# Patient Record
Sex: MALE | Race: WHITE | HISPANIC OR LATINO
[De-identification: names, ages, dates, MRNs, and addresses within clinical notes are randomized per-mention and may not be internally consistent; named-entity substitution may affect disease eponyms.]

---

## 2019-06-08 ENCOUNTER — TRANSCRIPTION ENCOUNTER (OUTPATIENT)
Age: 19
End: 2019-06-08

## 2019-06-09 ENCOUNTER — RESULT REVIEW (OUTPATIENT)
Age: 19
End: 2019-06-09

## 2019-06-09 ENCOUNTER — INPATIENT (INPATIENT)
Facility: HOSPITAL | Age: 19
LOS: 0 days | Discharge: HOME | DRG: 343 | End: 2019-06-10
Attending: SURGERY | Admitting: SURGERY
Payer: MEDICAID

## 2019-06-09 VITALS
RESPIRATION RATE: 18 BRPM | OXYGEN SATURATION: 99 % | WEIGHT: 184.97 LBS | TEMPERATURE: 98 F | HEART RATE: 83 BPM | SYSTOLIC BLOOD PRESSURE: 146 MMHG | DIASTOLIC BLOOD PRESSURE: 70 MMHG | HEIGHT: 64 IN

## 2019-06-09 DIAGNOSIS — K35.80 UNSPECIFIED ACUTE APPENDICITIS: ICD-10-CM

## 2019-06-09 LAB
ALBUMIN SERPL ELPH-MCNC: 4.3 G/DL — SIGNIFICANT CHANGE UP (ref 3.3–5.2)
ALP SERPL-CCNC: 103 U/L — SIGNIFICANT CHANGE UP (ref 40–120)
ALT FLD-CCNC: 101 U/L — HIGH
ANION GAP SERPL CALC-SCNC: 12 MMOL/L — SIGNIFICANT CHANGE UP (ref 5–17)
APPEARANCE UR: CLEAR — SIGNIFICANT CHANGE UP
AST SERPL-CCNC: 35 U/L — SIGNIFICANT CHANGE UP
BACTERIA # UR AUTO: NEGATIVE — SIGNIFICANT CHANGE UP
BILIRUB SERPL-MCNC: 0.4 MG/DL — SIGNIFICANT CHANGE UP (ref 0.4–2)
BILIRUB UR-MCNC: NEGATIVE — SIGNIFICANT CHANGE UP
BUN SERPL-MCNC: 12 MG/DL — SIGNIFICANT CHANGE UP (ref 8–20)
CALCIUM SERPL-MCNC: 9.3 MG/DL — SIGNIFICANT CHANGE UP (ref 8.6–10.2)
CHLORIDE SERPL-SCNC: 105 MMOL/L — SIGNIFICANT CHANGE UP (ref 98–107)
CO2 SERPL-SCNC: 22 MMOL/L — SIGNIFICANT CHANGE UP (ref 22–29)
COLOR SPEC: YELLOW — SIGNIFICANT CHANGE UP
CREAT SERPL-MCNC: 0.78 MG/DL — SIGNIFICANT CHANGE UP (ref 0.5–1.3)
DIFF PNL FLD: ABNORMAL
EPI CELLS # UR: SIGNIFICANT CHANGE UP
GLUCOSE SERPL-MCNC: 102 MG/DL — SIGNIFICANT CHANGE UP (ref 70–115)
GLUCOSE UR QL: NEGATIVE MG/DL — SIGNIFICANT CHANGE UP
HCT VFR BLD CALC: 43.2 % — SIGNIFICANT CHANGE UP (ref 42–52)
HGB BLD-MCNC: 15.2 G/DL — SIGNIFICANT CHANGE UP (ref 14–18)
KETONES UR-MCNC: NEGATIVE — SIGNIFICANT CHANGE UP
LEUKOCYTE ESTERASE UR-ACNC: NEGATIVE — SIGNIFICANT CHANGE UP
LIDOCAIN IGE QN: 31 U/L — SIGNIFICANT CHANGE UP (ref 22–51)
MCHC RBC-ENTMCNC: 30.1 PG — SIGNIFICANT CHANGE UP (ref 27–31)
MCHC RBC-ENTMCNC: 35.2 G/DL — SIGNIFICANT CHANGE UP (ref 32–36)
MCV RBC AUTO: 85.5 FL — SIGNIFICANT CHANGE UP (ref 80–94)
NITRITE UR-MCNC: NEGATIVE — SIGNIFICANT CHANGE UP
PH UR: 6 — SIGNIFICANT CHANGE UP (ref 5–8)
PLATELET # BLD AUTO: 275 K/UL — SIGNIFICANT CHANGE UP (ref 150–400)
POTASSIUM SERPL-MCNC: 4.2 MMOL/L — SIGNIFICANT CHANGE UP (ref 3.5–5.3)
POTASSIUM SERPL-SCNC: 4.2 MMOL/L — SIGNIFICANT CHANGE UP (ref 3.5–5.3)
PROT SERPL-MCNC: 7.9 G/DL — SIGNIFICANT CHANGE UP (ref 6.6–8.7)
PROT UR-MCNC: 15 MG/DL
RBC # BLD: 5.05 M/UL — SIGNIFICANT CHANGE UP (ref 4.6–6.2)
RBC # FLD: 13.5 % — SIGNIFICANT CHANGE UP (ref 11–15.6)
RBC CASTS # UR COMP ASSIST: ABNORMAL /HPF (ref 0–4)
SODIUM SERPL-SCNC: 139 MMOL/L — SIGNIFICANT CHANGE UP (ref 135–145)
SP GR SPEC: 1.01 — SIGNIFICANT CHANGE UP (ref 1.01–1.02)
UROBILINOGEN FLD QL: NEGATIVE MG/DL — SIGNIFICANT CHANGE UP
WBC # BLD: 9.7 K/UL — SIGNIFICANT CHANGE UP (ref 4.8–10.8)
WBC # FLD AUTO: 9.7 K/UL — SIGNIFICANT CHANGE UP (ref 4.8–10.8)
WBC UR QL: SIGNIFICANT CHANGE UP

## 2019-06-09 PROCEDURE — 44970 LAPAROSCOPY APPENDECTOMY: CPT

## 2019-06-09 PROCEDURE — 74177 CT ABD & PELVIS W/CONTRAST: CPT | Mod: 26

## 2019-06-09 PROCEDURE — 88304 TISSUE EXAM BY PATHOLOGIST: CPT | Mod: 26

## 2019-06-09 PROCEDURE — 99285 EMERGENCY DEPT VISIT HI MDM: CPT | Mod: 25

## 2019-06-09 RX ORDER — HYDROMORPHONE HYDROCHLORIDE 2 MG/ML
0.5 INJECTION INTRAMUSCULAR; INTRAVENOUS; SUBCUTANEOUS
Refills: 0 | Status: DISCONTINUED | OUTPATIENT
Start: 2019-06-09 | End: 2019-06-09

## 2019-06-09 RX ORDER — CEFOTETAN DISODIUM 1 G
2 VIAL (EA) INJECTION EVERY 12 HOURS
Refills: 0 | Status: DISCONTINUED | OUTPATIENT
Start: 2019-06-09 | End: 2019-06-10

## 2019-06-09 RX ORDER — MORPHINE SULFATE 50 MG/1
1 CAPSULE, EXTENDED RELEASE ORAL EVERY 4 HOURS
Refills: 0 | Status: DISCONTINUED | OUTPATIENT
Start: 2019-06-09 | End: 2019-06-10

## 2019-06-09 RX ORDER — TRAMADOL HYDROCHLORIDE 50 MG/1
25 TABLET ORAL EVERY 4 HOURS
Refills: 0 | Status: DISCONTINUED | OUTPATIENT
Start: 2019-06-09 | End: 2019-06-10

## 2019-06-09 RX ORDER — KETOROLAC TROMETHAMINE 30 MG/ML
15 SYRINGE (ML) INJECTION ONCE
Refills: 0 | Status: DISCONTINUED | OUTPATIENT
Start: 2019-06-09 | End: 2019-06-09

## 2019-06-09 RX ORDER — SODIUM CHLORIDE 9 MG/ML
1000 INJECTION, SOLUTION INTRAVENOUS
Refills: 0 | Status: DISCONTINUED | OUTPATIENT
Start: 2019-06-09 | End: 2019-06-09

## 2019-06-09 RX ORDER — ONDANSETRON 8 MG/1
4 TABLET, FILM COATED ORAL EVERY 6 HOURS
Refills: 0 | Status: DISCONTINUED | OUTPATIENT
Start: 2019-06-09 | End: 2019-06-10

## 2019-06-09 RX ORDER — MORPHINE SULFATE 50 MG/1
4 CAPSULE, EXTENDED RELEASE ORAL ONCE
Refills: 0 | Status: DISCONTINUED | OUTPATIENT
Start: 2019-06-09 | End: 2019-06-09

## 2019-06-09 RX ORDER — ONDANSETRON 8 MG/1
4 TABLET, FILM COATED ORAL ONCE
Refills: 0 | Status: DISCONTINUED | OUTPATIENT
Start: 2019-06-09 | End: 2019-06-09

## 2019-06-09 RX ORDER — TRAMADOL HYDROCHLORIDE 50 MG/1
50 TABLET ORAL EVERY 6 HOURS
Refills: 0 | Status: DISCONTINUED | OUTPATIENT
Start: 2019-06-09 | End: 2019-06-10

## 2019-06-09 RX ORDER — SODIUM CHLORIDE 9 MG/ML
1000 INJECTION INTRAMUSCULAR; INTRAVENOUS; SUBCUTANEOUS ONCE
Refills: 0 | Status: COMPLETED | OUTPATIENT
Start: 2019-06-09 | End: 2019-06-09

## 2019-06-09 RX ORDER — FENTANYL CITRATE 50 UG/ML
25 INJECTION INTRAVENOUS ONCE
Refills: 0 | Status: DISCONTINUED | OUTPATIENT
Start: 2019-06-09 | End: 2019-06-09

## 2019-06-09 RX ORDER — ONDANSETRON 8 MG/1
4 TABLET, FILM COATED ORAL ONCE
Refills: 0 | Status: COMPLETED | OUTPATIENT
Start: 2019-06-09 | End: 2019-06-09

## 2019-06-09 RX ORDER — ACETAMINOPHEN 500 MG
975 TABLET ORAL EVERY 6 HOURS
Refills: 0 | Status: DISCONTINUED | OUTPATIENT
Start: 2019-06-09 | End: 2019-06-10

## 2019-06-09 RX ADMIN — HYDROMORPHONE HYDROCHLORIDE 0.5 MILLIGRAM(S): 2 INJECTION INTRAMUSCULAR; INTRAVENOUS; SUBCUTANEOUS at 20:40

## 2019-06-09 RX ADMIN — TRAMADOL HYDROCHLORIDE 50 MILLIGRAM(S): 50 TABLET ORAL at 21:11

## 2019-06-09 RX ADMIN — FENTANYL CITRATE 25 MICROGRAM(S): 50 INJECTION INTRAVENOUS at 10:45

## 2019-06-09 RX ADMIN — MORPHINE SULFATE 4 MILLIGRAM(S): 50 CAPSULE, EXTENDED RELEASE ORAL at 09:20

## 2019-06-09 RX ADMIN — ONDANSETRON 4 MILLIGRAM(S): 8 TABLET, FILM COATED ORAL at 10:48

## 2019-06-09 RX ADMIN — MORPHINE SULFATE 4 MILLIGRAM(S): 50 CAPSULE, EXTENDED RELEASE ORAL at 09:27

## 2019-06-09 RX ADMIN — FENTANYL CITRATE 25 MICROGRAM(S): 50 INJECTION INTRAVENOUS at 13:02

## 2019-06-09 RX ADMIN — MORPHINE SULFATE 4 MILLIGRAM(S): 50 CAPSULE, EXTENDED RELEASE ORAL at 14:39

## 2019-06-09 RX ADMIN — Medication 100 GRAM(S): at 15:20

## 2019-06-09 RX ADMIN — SODIUM CHLORIDE 1000 MILLILITER(S): 9 INJECTION INTRAMUSCULAR; INTRAVENOUS; SUBCUTANEOUS at 08:50

## 2019-06-09 RX ADMIN — TRAMADOL HYDROCHLORIDE 50 MILLIGRAM(S): 50 TABLET ORAL at 22:15

## 2019-06-09 RX ADMIN — Medication 15 MILLIGRAM(S): at 08:50

## 2019-06-09 RX ADMIN — HYDROMORPHONE HYDROCHLORIDE 0.5 MILLIGRAM(S): 2 INJECTION INTRAMUSCULAR; INTRAVENOUS; SUBCUTANEOUS at 20:30

## 2019-06-09 RX ADMIN — MORPHINE SULFATE 4 MILLIGRAM(S): 50 CAPSULE, EXTENDED RELEASE ORAL at 13:06

## 2019-06-09 RX ADMIN — Medication 15 MILLIGRAM(S): at 09:10

## 2019-06-09 RX ADMIN — SODIUM CHLORIDE 1000 MILLILITER(S): 9 INJECTION INTRAMUSCULAR; INTRAVENOUS; SUBCUTANEOUS at 09:11

## 2019-06-09 NOTE — H&P ADULT - HISTORY OF PRESENT ILLNESS
17 yo M w/ CC of abdominal pain. Onset: Yesterday, mid-day. Never experienced this type of pain before. Localized to RLQ, constant, sharp, getting progressively worse. Denies nausea/vomiting. Last BM was yesterday - loose, no blood. Voiding at baseline, no burning. Denies fevers/chills. Last ate yesterday night.    Pmhx: Denies  Pshx: Denies  Meds: Denies  Allergies: NKDA  Shx: Denies x 3

## 2019-06-09 NOTE — ED PROVIDER NOTE - ATTENDING CONTRIBUTION TO CARE
I, Crystal Lopez, performed the initial face to face bedside interview with this patient regarding history of present illness, review of symptoms and relevant past medical, social and family history.  I completed an independent physical examination.  I was the initial provider who evaluated this patient. I have signed out the follow up of any pending tests (i.e. labs, radiological studies) to the ACP.  I have communicated the patient’s plan of care and disposition with the ACP.

## 2019-06-09 NOTE — ED ADULT NURSE REASSESSMENT NOTE - NS ED NURSE REASSESS COMMENT FT1
pt status unchanged, refer to flowsheet and chart, pt safety maintained, pt hemodynamically stable. Pt returned from CT Scan, accompanied by his mother, flank pain is back to 8/10. RONNY Bronson notified.

## 2019-06-09 NOTE — ED ADULT NURSE NOTE - NSIMPLEMENTINTERV_GEN_ALL_ED
Implemented All Universal Safety Interventions:  [place] to call system. Call bell, personal items and telephone within reach. Instruct patient to call for assistance. Room bathroom lighting operational. Non-slip footwear when patient is off stretcher. Physically safe environment: no spills, clutter or unnecessary equipment. Stretcher in lowest position, wheels locked, appropriate side rails in place.

## 2019-06-09 NOTE — ED ADULT NURSE REASSESSMENT NOTE - NS ED NURSE REASSESS COMMENT FT1
Report given to OR RN, pt cleared to transport off monitor. pt status unchanged, refer to flowsheet and chart, pt safety maintained, pt hemodynamically stable.

## 2019-06-09 NOTE — ED PROVIDER NOTE - CHPI ED SYMPTOMS NEG
no nausea/no burning urination/no fever/no chills/no dysuria/no hematuria/no vomiting no dysuria/no hematuria/no burning urination/no chills/no diarrhea/no vomiting/no abdominal distension/no fever/no blood in stool/no nausea

## 2019-06-09 NOTE — ED ADULT NURSE NOTE - OBJECTIVE STATEMENT
18y A&Ox4, c/o R flank pain that began last night. Pt states he's had diarrhea and chills for a week. Pt reports no relieving factors, states changing position causes pain. Pt denies having a fever.

## 2019-06-09 NOTE — H&P ADULT - ASSESSMENT
17 yo M w/ CT findings of thickened appendiceal wall and surrounding inflammation concerning for early appendicitis    Admit to surgery  NPO/IVF  Pain control  IV abx  Preop and board for laparoscopic appendectomy, possible open

## 2019-06-09 NOTE — H&P ADULT - ATTENDING COMMENTS
Seen and examined.  Details per resident H+P.  17 yo male presents w/ 1 day h/o abdominal pain localized to the RLQ.  No N/V/F/C.  Afebrile.  HD normal.  No leukocytosis.  NAD.  Abdomen: S/ND, TTP RLQ w/ localized rebound.  CT A/P reviewed - dilated appendix w/ periappendiceal stranding.  Clinically and radiographically acute appendicitis.  Plan for lap appy, possible open.

## 2019-06-09 NOTE — H&P ADULT - NSHPPHYSICALEXAM_GEN_ALL_CORE
PE  Gen: AOX3, mild acute distress  Pulm: Non labored breathing, CTAB  CV: RRR  Abd: Soft, mild distension, +TTP RLQ at Mcburney's point, focal guarding, no rebound  Ext: Moving all 4 extremities  Vasc: +2 radial pulses bilaterally  Neuro: Good affect

## 2019-06-09 NOTE — ED PROVIDER NOTE - OBJECTIVE STATEMENT
18 year old male with no PMHx presents to the ED for 1 day of sudden onset RLQ pain. Also 18 year old male with no PMHx presents to the ED for 1 day of sudden onset, constant RLQ pain. Also reports a few episodes of diarrhea this morning. Denies fever, chills, nausea, vomiting. No sick contacts. Never experienced pain like this before.

## 2019-06-09 NOTE — ED ADULT NURSE NOTE - CHPI ED NUR SYMPTOMS NEG
no burning urination/no nausea/no blood in stool/no dysuria/no hematuria/no abdominal distension/no vomiting/no fever

## 2019-06-09 NOTE — ED PROVIDER NOTE - CLINICAL SUMMARY MEDICAL DECISION MAKING FREE TEXT BOX
18 year old male with no PMHx presents to the ED for sudden onset RLQ pain which started yesterday, associated with diarrhea. + RLQ tenderness. Will order labs, urine and CT abd/ pelvis with contrast to r/o appy. 18 year old male with no PMHx presents to the ED for sudden onset RLQ pain which started yesterday, associated with diarrhea. + RLQ tenderness. Will order labs, urine and CT abd/ pelvis with contrast to r/o appy.  PT REQUIRED MULTIPLE DOSES PAIN MEDS. LABS DOCUMENTED. CT SIG ACUTE APPENDICITIS. SURGERY CONSULT PERFORMED AND THEY ACCEPT PT ONTO THEIR SERVICE WITH PLAN TO OPERATE THIS AFTERNOON

## 2019-06-09 NOTE — H&P ADULT - NSHPLABSRESULTS_GEN_ALL_CORE
I&O's Detail      LABS:                        15.2   9.7   )-----------( 275      ( 2019 09:21 )             43.2         139  |  105  |  12.0  ----------------------------<  102  4.2   |  22.0  |  0.78    Ca    9.3      2019 09:21    TPro  7.9  /  Alb  4.3  /  TBili  0.4  /  DBili  x   /  AST  35  /  ALT  101<H>  /  AlkPhos  103        Urinalysis Basic - ( 2019 09:31 )    Color: Yellow / Appearance: Clear / S.015 / pH: x  Gluc: x / Ketone: Negative  / Bili: Negative / Urobili: Negative mg/dL   Blood: x / Protein: 15 mg/dL / Nitrite: Negative   Leuk Esterase: Negative / RBC: 3-5 /HPF / WBC 0-2   Sq Epi: x / Non Sq Epi: Occasional / Bacteria: Negative        RADIOLOGY & ADDITIONAL STUDIES:

## 2019-06-09 NOTE — ED PROVIDER NOTE - PHYSICAL EXAMINATION
+ RLQ tenderness to palp with rebound and guarding, mild abd distension, - Rosving sign   Lungs CTA, = chest rise and fall   Nl s1, s2, no murmurs, rubs or gallops

## 2019-06-09 NOTE — ED ADULT NURSE REASSESSMENT NOTE - NS ED NURSE REASSESS COMMENT FT1
pt status unchanged, refer to flowsheet and chart, pt safety maintained, pt hemodynamically stable. Awaiting surgery.

## 2019-06-10 ENCOUNTER — TRANSCRIPTION ENCOUNTER (OUTPATIENT)
Age: 19
End: 2019-06-10

## 2019-06-10 VITALS
SYSTOLIC BLOOD PRESSURE: 123 MMHG | HEART RATE: 86 BPM | OXYGEN SATURATION: 98 % | RESPIRATION RATE: 18 BRPM | TEMPERATURE: 98 F | DIASTOLIC BLOOD PRESSURE: 68 MMHG

## 2019-06-10 LAB
CULTURE RESULTS: NO GROWTH — SIGNIFICANT CHANGE UP
SPECIMEN SOURCE: SIGNIFICANT CHANGE UP

## 2019-06-10 PROCEDURE — 99024 POSTOP FOLLOW-UP VISIT: CPT

## 2019-06-10 RX ORDER — ACETAMINOPHEN 500 MG
3 TABLET ORAL
Qty: 0 | Refills: 0 | DISCHARGE
Start: 2019-06-10

## 2019-06-10 RX ORDER — TRAMADOL HYDROCHLORIDE 50 MG/1
0.5 TABLET ORAL
Qty: 12 | Refills: 0
Start: 2019-06-10

## 2019-06-10 RX ADMIN — TRAMADOL HYDROCHLORIDE 50 MILLIGRAM(S): 50 TABLET ORAL at 04:29

## 2019-06-10 RX ADMIN — TRAMADOL HYDROCHLORIDE 50 MILLIGRAM(S): 50 TABLET ORAL at 05:03

## 2019-06-10 RX ADMIN — Medication 100 GRAM(S): at 02:03

## 2019-06-10 NOTE — DISCHARGE NOTE PROVIDER - NSDCCPCAREPLAN_GEN_ALL_CORE_FT
PRINCIPAL DISCHARGE DIAGNOSIS  Diagnosis: Acute appendicitis, unspecified acute appendicitis type  Assessment and Plan of Treatment: PRINCIPAL DISCHARGE DIAGNOSIS  Diagnosis: Acute appendicitis, unspecified acute appendicitis type  Assessment and Plan of Treatment:   BATHING: Please do not submerge wound underwater. You may shower and/or sponge bathe.   ACTIVITY: No heavy lifting or straining. Otherwise, you may return to your usual level of physical activity. If you are taking narcotic pain medication (such as Percocet) DO NOT Do not Do not drive while taking pain medications while taking pain medications a car, operate machinery or make important decisions.  DIET: Return to your usual diet.  NOTIFY YOUR SURGEON IF: You have any bleeding that does not stop, any pus draining from your wound(s), any fever (over 100.4 F) or chills, persistent nausea/vomiting, persistent diarrhea, or if your pain is not controlled on your discharge pain medications.  FOLLOW-UP: Please follow up with your primary care physician and Acute Care Surgery clinic [phone] in 10-14 days regarding your hospitalization. Call for appointment upon discharge.

## 2019-06-10 NOTE — DISCHARGE NOTE PROVIDER - NSDCACTIVITY_GEN_ALL_CORE
No heavy lifting/straining/Walking - Outdoors allowed/Walking - Indoors allowed/Showering allowed/Driving allowed/Return to Work/School allowed

## 2019-06-10 NOTE — PROGRESS NOTE ADULT - ASSESSMENT
17yo M s/p lap appy with appropriate recovery.    -ambulate as tolerated  -pain control  -reg diet  -dispo: miri home in AM

## 2019-06-10 NOTE — DISCHARGE NOTE NURSING/CASE MANAGEMENT/SOCIAL WORK - NSDCDPATPORTLINK_GEN_ALL_CORE
You can access the [facility] Portal, offered by [facility], by registering with the following website: [email or web address]

## 2019-06-10 NOTE — DISCHARGE NOTE PROVIDER - HOSPITAL COURSE
17 yo M w/ CC of abdominal pain. Onset: Yesterday, mid-day. Never experienced this type of pain before. Localized to RLQ, constant, sharp, getting progressively worse. Denies nausea/vomiting. Last BM was yesterday - loose, no blood. Voiding at baseline, no burning. Denies fevers/chills. Last ate yesterday night.  CT scan confirmed acute appendicitis.  Pt was taken to OR last evening and under laparoscopic appendectomy, which revealed uncomplicated appendicitis.  Patient had stable postop course, able to tolerate diet, ambulate, and void.  Pt is to follow up at acute care surgery clinic in 1-2 weeks for followup.  Patient was deemed stable for discharge.

## 2019-06-10 NOTE — PROGRESS NOTE ADULT - SUBJECTIVE AND OBJECTIVE BOX
INTERVAL HPI/OVERNIGHT EVENTS:    SUBJECTIVE:  Pt admitted for acute appendicitis is now s/p lap appy.  Uncomplicated periop, minimal EBL; postop stable course.  Able to void within one hour after postop removal of [place].  Tolerating regular diet.    Pt seen at bedside resting comfortably.      MEDICATIONS  (STANDING):  cefoTEtan  IVPB 2 Gram(s) IV Intermittent every 12 hours    MEDICATIONS  (PRN):  acetaminophen   Tablet .. 975 milliGRAM(s) Oral every 6 hours PRN Mild Pain (1 - 3), Moderate Pain (4 - 6)  morphine  - Injectable 1 milliGRAM(s) IV Push every 4 hours PRN Moderate Pain (4 - 6)  ondansetron Injectable 4 milliGRAM(s) IV Push every 6 hours PRN Nausea  traMADol 25 milliGRAM(s) Oral every 4 hours PRN Moderate Pain (4 - 6)  traMADol 50 milliGRAM(s) Oral every 6 hours PRN Severe Pain (7 - 10)      Vital Signs Last 24 Hrs  T(C): 36.7 (2019 22:27), Max: 36.9 (2019 07:53)  T(F): 98.1 (2019 22:27), Max: 98.5 (2019 07:53)  HR: 92 (2019 22:27) (69 - 95)  BP: 126/71 (2019 22:27) (120/76 - 146/70)  BP(mean): 93 (2019 21:45) (88 - 113)  RR: 18 (2019 22:27) (15 - 21)  SpO2: 96% (2019 22:27) (96% - 100%)    PE  Gen:  Pulm:  CV:  Abd:  :  Ext:  Vasc:  Neuro:      I&O's Detail    2019 07:01  -  10 Luis 2019 00:37  --------------------------------------------------------  IN:    lactated ringers.: 200 mL    Oral Fluid: 360 mL  Total IN: 560 mL    OUT:    Voided: 475 mL  Total OUT: 475 mL    Total NET: 85 mL          LABS:                        15.2   9.7   )-----------( 275      ( 2019 09:21 )             43.2         139  |  105  |  12.0  ----------------------------<  102  4.2   |  22.0  |  0.78    Ca    9.3      2019 09:21    TPro  7.9  /  Alb  4.3  /  TBili  0.4  /  DBili  x   /  AST  35  /  ALT  101<H>  /  AlkPhos  103  -09      Urinalysis Basic - ( 2019 09:31 )    Color: Yellow / Appearance: Clear / S.015 / pH: x  Gluc: x / Ketone: Negative  / Bili: Negative / Urobili: Negative mg/dL   Blood: x / Protein: 15 mg/dL / Nitrite: Negative   Leuk Esterase: Negative / RBC: 3-5 /HPF / WBC 0-2   Sq Epi: x / Non Sq Epi: Occasional / Bacteria: Negative        RADIOLOGY & ADDITIONAL STUDIES: INTERVAL HPI/OVERNIGHT EVENTS:    SUBJECTIVE:  Pt admitted for acute appendicitis is now s/p lap appy.  Uncomplicated periop, minimal EBL; postop stable course.  Able to void within one hour after postop removal of [place].  Tolerating regular diet.    Pt seen at bedside resting comfortably.  Pt c/o of abdominal soreness.  No n/v/sob/cp.  Pt does mention feeling hot, but no fevers noted.  Pt voiding and tolerating regular diet.      MEDICATIONS  (STANDING):  cefoTEtan  IVPB 2 Gram(s) IV Intermittent every 12 hours    MEDICATIONS  (PRN):  acetaminophen   Tablet .. 975 milliGRAM(s) Oral every 6 hours PRN Mild Pain (1 - 3), Moderate Pain (4 - 6)  morphine  - Injectable 1 milliGRAM(s) IV Push every 4 hours PRN Moderate Pain (4 - 6)  ondansetron Injectable 4 milliGRAM(s) IV Push every 6 hours PRN Nausea  traMADol 25 milliGRAM(s) Oral every 4 hours PRN Moderate Pain (4 - 6)  traMADol 50 milliGRAM(s) Oral every 6 hours PRN Severe Pain (7 - 10)      Vital Signs Last 24 Hrs  T(C): 36.7 (2019 22:27), Max: 36.9 (2019 07:53)  T(F): 98.1 (2019 22:27), Max: 98.5 (2019 07:53)  HR: 92 (2019 22:27) (69 - 95)  BP: 126/71 (2019 22:27) (120/76 - 146/70)  BP(mean): 93 (2019 21:45) (88 - 113)  RR: 18 (2019 22:27) (15 - 21)  SpO2: 96% (2019 22:27) (96% - 100%)    PE  Gen: NAD  Pulm: CTAB/L  CV: RRR  Abd: soft, appropriately tender to palpation at incision sites, nondistended; incision sites c/d/i  : voiding  Ext: no cyanosis, clubbing or edema  Neuro: AAOx3, no sensory or motor deficits      I&O's Detail    2019 07:01  -  10 Luis 2019 00:37  --------------------------------------------------------  IN:    lactated ringers.: 200 mL    Oral Fluid: 360 mL  Total IN: 560 mL    OUT:    Voided: 475 mL  Total OUT: 475 mL    Total NET: 85 mL          LABS:                        15.2   9.7   )-----------( 275      ( 2019 09:21 )             43.2         139  |  105  |  12.0  ----------------------------<  102  4.2   |  22.0  |  0.78    Ca    9.3      2019 09:21    TPro  7.9  /  Alb  4.3  /  TBili  0.4  /  DBili  x   /  AST  35  /  ALT  101<H>  /  AlkPhos  103  -      Urinalysis Basic - ( 2019 09:31 )    Color: Yellow / Appearance: Clear / S.015 / pH: x  Gluc: x / Ketone: Negative  / Bili: Negative / Urobili: Negative mg/dL   Blood: x / Protein: 15 mg/dL / Nitrite: Negative   Leuk Esterase: Negative / RBC: 3-5 /HPF / WBC 0-2   Sq Epi: x / Non Sq Epi: Occasional / Bacteria: Negative        RADIOLOGY & ADDITIONAL STUDIES:

## 2019-06-10 NOTE — DISCHARGE NOTE PROVIDER - NSFOLLOWUPCLINICS_GEN_ALL_ED_FT
[facility] Acute Care Surgery  Acute Care Surgery  [address] [place] [id number]  Phone: [phone]  Fax:   Follow Up Time:

## 2019-06-12 LAB — SURGICAL PATHOLOGY STUDY: SIGNIFICANT CHANGE UP

## 2019-06-17 PROBLEM — Z78.9 OTHER SPECIFIED HEALTH STATUS: Chronic | Status: ACTIVE | Noted: 2019-06-09

## 2019-06-20 ENCOUNTER — APPOINTMENT (OUTPATIENT)
Dept: TRAUMA SURGERY | Facility: CLINIC | Age: 19
End: 2019-06-20
Payer: MEDICAID

## 2019-06-20 VITALS
SYSTOLIC BLOOD PRESSURE: 121 MMHG | HEART RATE: 75 BPM | DIASTOLIC BLOOD PRESSURE: 78 MMHG | TEMPERATURE: 98.7 F | WEIGHT: 198 LBS | OXYGEN SATURATION: 98 % | HEIGHT: 63 IN | BODY MASS INDEX: 35.08 KG/M2

## 2019-06-20 DIAGNOSIS — K35.33 ACUTE APPENDICITIS W/ PERFORATION AND LOCALIZED PERITONITIS, W/ABSCESS: ICD-10-CM

## 2019-06-20 PROBLEM — Z00.00 ENCOUNTER FOR PREVENTIVE HEALTH EXAMINATION: Status: ACTIVE | Noted: 2019-06-20

## 2019-06-20 PROCEDURE — 99024 POSTOP FOLLOW-UP VISIT: CPT

## 2019-08-14 ENCOUNTER — EMERGENCY (EMERGENCY)
Facility: HOSPITAL | Age: 19
LOS: 1 days | Discharge: HOME | End: 2019-08-14
Attending: EMERGENCY MEDICINE
Payer: MEDICAID

## 2019-08-14 VITALS
HEART RATE: 96 BPM | TEMPERATURE: 98 F | WEIGHT: 190.04 LBS | OXYGEN SATURATION: 96 % | DIASTOLIC BLOOD PRESSURE: 85 MMHG | SYSTOLIC BLOOD PRESSURE: 135 MMHG | RESPIRATION RATE: 18 BRPM | HEIGHT: 63 IN

## 2019-08-14 DIAGNOSIS — Z90.49 ACQUIRED ABSENCE OF OTHER SPECIFIED PARTS OF DIGESTIVE TRACT: Chronic | ICD-10-CM

## 2019-08-14 LAB
ALBUMIN SERPL ELPH-MCNC: 4.5 G/DL — SIGNIFICANT CHANGE UP (ref 3.3–5.2)
ALP SERPL-CCNC: 129 U/L — HIGH (ref 40–120)
ALT FLD-CCNC: 114 U/L — HIGH
ANION GAP SERPL CALC-SCNC: 13 MMOL/L — SIGNIFICANT CHANGE UP (ref 5–17)
APPEARANCE UR: CLEAR — SIGNIFICANT CHANGE UP
APTT BLD: 31.6 SEC — SIGNIFICANT CHANGE UP (ref 27.5–36.3)
AST SERPL-CCNC: 52 U/L — HIGH
BACTERIA # UR AUTO: ABNORMAL
BASOPHILS # BLD AUTO: 0.05 K/UL — SIGNIFICANT CHANGE UP (ref 0–0.2)
BASOPHILS NFR BLD AUTO: 0.6 % — SIGNIFICANT CHANGE UP (ref 0–2)
BILIRUB SERPL-MCNC: 0.4 MG/DL — SIGNIFICANT CHANGE UP (ref 0.4–2)
BILIRUB UR-MCNC: NEGATIVE — SIGNIFICANT CHANGE UP
BUN SERPL-MCNC: 12 MG/DL — SIGNIFICANT CHANGE UP (ref 8–20)
CALCIUM SERPL-MCNC: 9.5 MG/DL — SIGNIFICANT CHANGE UP (ref 8.6–10.2)
CHLORIDE SERPL-SCNC: 102 MMOL/L — SIGNIFICANT CHANGE UP (ref 98–107)
CO2 SERPL-SCNC: 24 MMOL/L — SIGNIFICANT CHANGE UP (ref 22–29)
COLOR SPEC: YELLOW — SIGNIFICANT CHANGE UP
CREAT SERPL-MCNC: 0.85 MG/DL — SIGNIFICANT CHANGE UP (ref 0.5–1.3)
DIFF PNL FLD: ABNORMAL
EOSINOPHIL # BLD AUTO: 0.22 K/UL — SIGNIFICANT CHANGE UP (ref 0–0.5)
EOSINOPHIL NFR BLD AUTO: 2.8 % — SIGNIFICANT CHANGE UP (ref 0–6)
EPI CELLS # UR: NEGATIVE — SIGNIFICANT CHANGE UP
GLUCOSE SERPL-MCNC: 101 MG/DL — SIGNIFICANT CHANGE UP (ref 70–115)
GLUCOSE UR QL: NEGATIVE MG/DL — SIGNIFICANT CHANGE UP
HCT VFR BLD CALC: 44.3 % — SIGNIFICANT CHANGE UP (ref 39–50)
HGB BLD-MCNC: 15.3 G/DL — SIGNIFICANT CHANGE UP (ref 13–17)
IMM GRANULOCYTES NFR BLD AUTO: 0.4 % — SIGNIFICANT CHANGE UP (ref 0–1.5)
INR BLD: 0.98 RATIO — SIGNIFICANT CHANGE UP (ref 0.88–1.16)
KETONES UR-MCNC: NEGATIVE — SIGNIFICANT CHANGE UP
LACTATE BLDV-MCNC: 1.2 MMOL/L — SIGNIFICANT CHANGE UP (ref 0.5–2)
LEUKOCYTE ESTERASE UR-ACNC: NEGATIVE — SIGNIFICANT CHANGE UP
LYMPHOCYTES # BLD AUTO: 2.27 K/UL — SIGNIFICANT CHANGE UP (ref 1–3.3)
LYMPHOCYTES # BLD AUTO: 28.6 % — SIGNIFICANT CHANGE UP (ref 13–44)
MCHC RBC-ENTMCNC: 29.8 PG — SIGNIFICANT CHANGE UP (ref 27–34)
MCHC RBC-ENTMCNC: 34.5 GM/DL — SIGNIFICANT CHANGE UP (ref 32–36)
MCV RBC AUTO: 86.4 FL — SIGNIFICANT CHANGE UP (ref 80–100)
MONOCYTES # BLD AUTO: 0.65 K/UL — SIGNIFICANT CHANGE UP (ref 0–0.9)
MONOCYTES NFR BLD AUTO: 8.2 % — SIGNIFICANT CHANGE UP (ref 2–14)
NEUTROPHILS # BLD AUTO: 4.72 K/UL — SIGNIFICANT CHANGE UP (ref 1.8–7.4)
NEUTROPHILS NFR BLD AUTO: 59.4 % — SIGNIFICANT CHANGE UP (ref 43–77)
NITRITE UR-MCNC: NEGATIVE — SIGNIFICANT CHANGE UP
PH UR: 5 — SIGNIFICANT CHANGE UP (ref 5–8)
PLATELET # BLD AUTO: 307 K/UL — SIGNIFICANT CHANGE UP (ref 150–400)
POTASSIUM SERPL-MCNC: 4.5 MMOL/L — SIGNIFICANT CHANGE UP (ref 3.5–5.3)
POTASSIUM SERPL-SCNC: 4.5 MMOL/L — SIGNIFICANT CHANGE UP (ref 3.5–5.3)
PROT SERPL-MCNC: 7.9 G/DL — SIGNIFICANT CHANGE UP (ref 6.6–8.7)
PROT UR-MCNC: 15 MG/DL
PROTHROM AB SERPL-ACNC: 11.3 SEC — SIGNIFICANT CHANGE UP (ref 10–12.9)
RBC # BLD: 5.13 M/UL — SIGNIFICANT CHANGE UP (ref 4.2–5.8)
RBC # FLD: 13.5 % — SIGNIFICANT CHANGE UP (ref 10.3–14.5)
RBC CASTS # UR COMP ASSIST: SIGNIFICANT CHANGE UP /HPF (ref 0–4)
SODIUM SERPL-SCNC: 139 MMOL/L — SIGNIFICANT CHANGE UP (ref 135–145)
SP GR SPEC: 1.02 — SIGNIFICANT CHANGE UP (ref 1.01–1.02)
UROBILINOGEN FLD QL: NEGATIVE MG/DL — SIGNIFICANT CHANGE UP
WBC # BLD: 7.94 K/UL — SIGNIFICANT CHANGE UP (ref 3.8–10.5)
WBC # FLD AUTO: 7.94 K/UL — SIGNIFICANT CHANGE UP (ref 3.8–10.5)
WBC UR QL: SIGNIFICANT CHANGE UP

## 2019-08-14 PROCEDURE — 74177 CT ABD & PELVIS W/CONTRAST: CPT | Mod: 26

## 2019-08-14 PROCEDURE — 74177 CT ABD & PELVIS W/CONTRAST: CPT

## 2019-08-14 PROCEDURE — 80053 COMPREHEN METABOLIC PANEL: CPT

## 2019-08-14 PROCEDURE — 36415 COLL VENOUS BLD VENIPUNCTURE: CPT

## 2019-08-14 PROCEDURE — 99284 EMERGENCY DEPT VISIT MOD MDM: CPT

## 2019-08-14 PROCEDURE — 85027 COMPLETE CBC AUTOMATED: CPT

## 2019-08-14 PROCEDURE — 81001 URINALYSIS AUTO W/SCOPE: CPT

## 2019-08-14 PROCEDURE — 85730 THROMBOPLASTIN TIME PARTIAL: CPT

## 2019-08-14 PROCEDURE — 99284 EMERGENCY DEPT VISIT MOD MDM: CPT | Mod: 25

## 2019-08-14 PROCEDURE — 83605 ASSAY OF LACTIC ACID: CPT

## 2019-08-14 PROCEDURE — 85610 PROTHROMBIN TIME: CPT

## 2019-08-14 PROCEDURE — 96374 THER/PROPH/DIAG INJ IV PUSH: CPT | Mod: XU

## 2019-08-14 RX ORDER — KETOROLAC TROMETHAMINE 30 MG/ML
15 SYRINGE (ML) INJECTION ONCE
Refills: 0 | Status: DISCONTINUED | OUTPATIENT
Start: 2019-08-14 | End: 2019-08-14

## 2019-08-14 RX ORDER — METHOCARBAMOL 500 MG/1
1 TABLET, FILM COATED ORAL
Qty: 15 | Refills: 0
Start: 2019-08-14 | End: 2019-08-18

## 2019-08-14 RX ORDER — IBUPROFEN 200 MG
1 TABLET ORAL
Qty: 15 | Refills: 0
Start: 2019-08-14 | End: 2019-08-18

## 2019-08-14 RX ORDER — SODIUM CHLORIDE 9 MG/ML
1000 INJECTION INTRAMUSCULAR; INTRAVENOUS; SUBCUTANEOUS ONCE
Refills: 0 | Status: COMPLETED | OUTPATIENT
Start: 2019-08-14 | End: 2019-08-14

## 2019-08-14 RX ADMIN — SODIUM CHLORIDE 1000 MILLILITER(S): 9 INJECTION INTRAMUSCULAR; INTRAVENOUS; SUBCUTANEOUS at 09:19

## 2019-08-14 RX ADMIN — Medication 15 MILLIGRAM(S): at 09:19

## 2019-08-14 NOTE — ED ADULT NURSE NOTE - OBJECTIVE STATEMENT
18 yom presents to ed c/o back pain with abdominal discomfort patient states he has been having chills and had surgery in june on appendix and hasn't felt better since. afebrile, denies n/v/d denies blood in bowel or urine denies changes in bowel patterns. patient passing gas. abdomen soft nondistended

## 2019-08-14 NOTE — ED PROVIDER NOTE - NS ED ROS FT
ROS: no CP/SOB. no cough. +fever. no n/v/d/c. +abd pain. no rash. no bleeding. no urinary complaints. no weakness. no vision changes. no HA. +back pain. no extremity swelling/deformity. No change in mental status.

## 2019-08-14 NOTE — ED PROVIDER NOTE - PROGRESS NOTE DETAILS
NP NOTE:  HPI, ROS, PE of intake doctor reviewed.  Labs with mild elevated LFT's can f/u with PCP.  Ct unremarkable, ibuprofen for pain, spine center f/u.

## 2019-08-14 NOTE — ED ADULT TRIAGE NOTE - CHIEF COMPLAINT QUOTE
"I have been having worsening lower back pain since my appendix surgery June 9th." Pt denies any injury to his back previously. Pt had an xray done in July and was told a vertebrate is pinching a nerve. denies any changes in bowel or bladder habits.

## 2019-08-14 NOTE — ED PROVIDER NOTE - ATTENDING CONTRIBUTION TO CARE
I, Marimar Tam, performed a face to face bedside interview with this patient regarding history of present illness, review of symptoms and relevant past medical, social and family history.  I completed an independent physical examination. Medical decision making, follow-up on ordered tests (ie labs, radiologic studies) and re-evaluation of the patient's status has been communicated to the ACP.  Disposition of the patient will be based on test outcome and response to ED interventions.     see mdm

## 2019-08-14 NOTE — ED PROVIDER NOTE - CLINICAL SUMMARY MEDICAL DECISION MAKING FREE TEXT BOX
patient with worsening back pain since sx, had xray told pinched nerve, no radiation does not intermittent numbness, no sign of cord compress. had subjective fevers last week- nontoxic appearing but now has abd pain. will get CT to eval for post-op complication. basic labs. toradol. UA. reasses

## 2019-08-14 NOTE — ED PROVIDER NOTE - PHYSICAL EXAMINATION
Gen: uncomfortable when moving in stretcher, nontoxic appearing, AOx3  Head: NCAT  HEENT: EOMI, oral mucosa moist, normal conjunctiva, neck supple  Lung: CTAB, no respiratory distress  CV: rrr, no murmur, Normal perfusion  Abd: soft, mild diffuse lower abd ttp with guarding no rebound, worse right lower quadrant, ND, no CVA tenderness  MSK: No edema, no visible deformities, +ttp lumbar region ~L4/L5 midline and rt paravertebral region  Neuro: No focal neurologic deficits, gait intact  Skin: No rash   Psych: normal affect

## 2019-08-14 NOTE — ED PROVIDER NOTE - OBJECTIVE STATEMENT
17yo M s/p lap appendectomy 6/6 since with low back pain midline/rt sided does not radiate sometimes feels like legs go numb. had fever last week. no GI/URI sx. mild abd pain. no urinary sx. no fever/chills. pain is constant worse with movement 10/10 at times. no urinary/bowel incontinence/retention.

## 2019-08-21 ENCOUNTER — APPOINTMENT (OUTPATIENT)
Dept: NEUROSURGERY | Facility: CLINIC | Age: 19
End: 2019-08-21
Payer: MEDICAID

## 2019-08-21 VITALS
HEART RATE: 83 BPM | HEIGHT: 64 IN | SYSTOLIC BLOOD PRESSURE: 122 MMHG | TEMPERATURE: 99 F | BODY MASS INDEX: 34.15 KG/M2 | WEIGHT: 200 LBS | DIASTOLIC BLOOD PRESSURE: 81 MMHG

## 2019-08-21 DIAGNOSIS — Z78.9 OTHER SPECIFIED HEALTH STATUS: ICD-10-CM

## 2019-08-21 DIAGNOSIS — M54.5 LOW BACK PAIN: ICD-10-CM

## 2019-08-21 DIAGNOSIS — Z83.3 FAMILY HISTORY OF DIABETES MELLITUS: ICD-10-CM

## 2019-08-21 PROCEDURE — 99203 OFFICE O/P NEW LOW 30 MIN: CPT

## 2019-08-21 RX ORDER — METHYLPREDNISOLONE 4 MG/1
4 TABLET ORAL
Qty: 1 | Refills: 0 | Status: ACTIVE | COMMUNITY
Start: 2019-08-21 | End: 1900-01-01

## 2019-08-21 RX ORDER — IBUPROFEN 800 MG/1
TABLET, FILM COATED ORAL
Refills: 0 | Status: ACTIVE | COMMUNITY

## 2019-08-21 RX ORDER — METHOCARBAMOL 500 MG/1
500 TABLET, FILM COATED ORAL
Refills: 0 | Status: ACTIVE | COMMUNITY

## 2019-08-21 NOTE — DATA REVIEWED
[de-identified] : LIVER: Fatty infiltration. 1.4 cm enhancing lesion in the right hepatic lobe \par (series 3 image 61)consistent with a portosystemic shunt.  No lumbar dislocations or fractures.

## 2019-08-21 NOTE — HISTORY OF PRESENT ILLNESS
[> 3 months] : more  than 3 months [Back] : back [Sharp] : sharp [Dull] : dull [Aching] : aching [de-identified] : Mr Frances is a 18 year old male who presents for initial neurosurgical evaluation of lower back pain.  No significant medical history.  Past surgical history includes appendectomy 6/9/2019. Patient mentions he has lower back pain which started 6/2019.  No trauma or injury.   He states he has bilateral lateral thigh pain described as numbness/tingling.  It does not extend past the knee.  Pain was so severe he presented to [facility]14/[facility].  CT abdomen/pelvis obtained.  No acute findings with exception of hepatic lesion.  Pain intensity 6/10.  Denies any saddle anesthesia, urinary or bowel incontinence.  He is ambulating independently.   States the pain is aggravated by bending, twisting and lifting.  It is improved with rest and NSAIDs.  He has not tried any physical therapy or pain management as of yet.   No LE EMG.   [FreeTextEntry1] : lower back pain

## 2019-08-21 NOTE — REASON FOR VISIT
[New Patient Visit] : a new patient visit [Referred By: _________] : Patient was referred by [facility] [FreeTextEntry1] : lower back pain

## 2019-08-21 NOTE — ASSESSMENT
[FreeTextEntry1] : Mr. Frances presents with above history and imaging.  Patient reports worsening lower back pain.  Patient will start a course of physical therapy for core strengthening and conditioning.  Patient will start a Medrol dose pack as directed.  Patient will be referred to PMD/general surgeon for further evaluation of incidental finding of an enhancing lesion of the hepatic lobe noted on CT.  He should follow up in 6 weeks to assess his recovery with lower back pain.  He knows to call the office if there are any new or worsening symptoms.

## 2019-08-21 NOTE — PHYSICAL EXAM
[General Appearance - Alert] : alert [General Appearance - In No Acute Distress] : in no acute distress [General Appearance - Well Developed] : well developed [General Appearance - Well Nourished] : well nourished [Oriented To Time, Place, And Person] : oriented to person, place, and time [Impaired Insight] : insight and judgment were intact [Affect] : the affect was normal [Mood] : the mood was normal [Place] : oriented to place [Person] : oriented to person [Time] : oriented to time [Short Term Intact] : short term memory intact [Fluency] : fluency intact [Comprehension] : comprehension intact [Concentration Intact] : normal concentrating ability [Cranial Nerves Oculomotor (III)] : extraocular motion intact [Cranial Nerves Optic (II)] : visual acuity intact bilaterally,  pupils equal round and reactive to light [Cranial Nerves Trigeminal (V)] : facial sensation intact symmetrically [Cranial Nerves Facial (VII)] : face symmetrical [Motor Tone] : muscle tone was normal in all four extremities [Cranial Nerves Accessory (XI - Cranial And Spinal)] : head turning and shoulder shrug symmetric [Cranial Nerves Glossopharyngeal (IX)] : tongue and palate midline [Motor Strength] : muscle strength was normal in all four extremities [Sensation Pain / Temperature Decrease] : pain and temperature was intact [Sensation Tactile Decrease] : light touch was intact [Abnormal Walk] : normal gait [Balance] : balance was intact [No Visual Abnormalities] : no visible abnormailities [Normal] : normal [Able to heel walk] : the patient was able to heel walk [Able to toe walk] : the patient was able to toe walk [Over the Past 2 Weeks, Have You Felt Little Interest or Pleasure Doing Things?] : 2.) Over the past 2 weeks, have you felt little interest or pleasure doing things? No [Over the Past 2 Weeks, Have You Felt Down, Depressed, or Hopeless?] : 1.) Over the past 2 weeks, have you felt down, depressed, or hopeless? No

## 2019-08-21 NOTE — REVIEW OF SYSTEMS
[As Noted in HPI] : as noted in HPI [Negative] : Heme/Lymph [Tingling] : no tingling [Numbness] : no numbness [Inability to Walk] : able to walk [Incontinence] : no incontinence [FreeTextEntry9] : lower back pain

## 2019-09-18 ENCOUNTER — RESULT REVIEW (OUTPATIENT)
Age: 19
End: 2019-09-18

## 2019-10-31 PROCEDURE — 87086 URINE CULTURE/COLONY COUNT: CPT

## 2019-10-31 PROCEDURE — 81001 URINALYSIS AUTO W/SCOPE: CPT

## 2019-10-31 PROCEDURE — 80053 COMPREHEN METABOLIC PANEL: CPT

## 2019-10-31 PROCEDURE — 83690 ASSAY OF LIPASE: CPT

## 2019-10-31 PROCEDURE — 85027 COMPLETE CBC AUTOMATED: CPT

## 2019-10-31 PROCEDURE — 88304 TISSUE EXAM BY PATHOLOGIST: CPT

## 2019-10-31 PROCEDURE — 36415 COLL VENOUS BLD VENIPUNCTURE: CPT

## 2019-10-31 PROCEDURE — 96375 TX/PRO/DX INJ NEW DRUG ADDON: CPT

## 2019-10-31 PROCEDURE — 96376 TX/PRO/DX INJ SAME DRUG ADON: CPT

## 2019-10-31 PROCEDURE — 99285 EMERGENCY DEPT VISIT HI MDM: CPT | Mod: 25

## 2019-10-31 PROCEDURE — 96374 THER/PROPH/DIAG INJ IV PUSH: CPT | Mod: XU

## 2019-10-31 PROCEDURE — 74177 CT ABD & PELVIS W/CONTRAST: CPT

## 2019-11-01 ENCOUNTER — APPOINTMENT (OUTPATIENT)
Dept: SURGERY | Facility: CLINIC | Age: 19
End: 2019-11-01

## 2019-12-02 NOTE — PROGRESS NOTE ADULT - ATTENDING COMMENTS
Patient seen and examined on rounds (8am to 11am)  pain under control, ]tolerating diet  home today
no

## 2020-07-22 ENCOUNTER — TRANSCRIPTION ENCOUNTER (OUTPATIENT)
Age: 20
End: 2020-07-22

## 2020-10-29 NOTE — ED PROVIDER NOTE - CONTEXT
Medication is being filled for 1 time refill only due to:  Patient needs to be seen because it has been more than one year since last visit.     Mahi SAVAGE RN, BSN         unknown

## 2021-08-30 ENCOUNTER — APPOINTMENT (OUTPATIENT)
Dept: ORTHOPEDIC SURGERY | Facility: CLINIC | Age: 21
End: 2021-08-30

## 2021-08-30 DIAGNOSIS — M25.511 PAIN IN RIGHT SHOULDER: ICD-10-CM

## 2021-09-08 NOTE — ED ADULT NURSE NOTE - NS PRO PASSIVE SMOKE EXP
Advance Care Planning   Healthcare Decision Maker:    Primary Decision Maker: Marivel Dixon \"Lanny\" - Child - [phone]    Secondary Decision Maker: Camelia French - Brother/Sister - [phone]    Click here to complete Healthcare Decision Makers including selection of the Healthcare Decision Maker Relationship (ie \"Primary\"). No

## 2025-02-21 NOTE — ED ADULT TRIAGE NOTE - AS O2 DELIVERY
SNF given report for patient discharge. Patient et to leave at 1900.    .Electronically signed by Kaylan Arana RN on 2/20/2025 at 7:32 PM     room air

## 2025-06-19 NOTE — ED ADULT TRIAGE NOTE - MODE OF ARRIVAL
[facility] provides services at a reduced cost to those who are determined to be eligible through [facility]’s financial assistance program. Information regarding [facility]’s financial assistance program can be found by going to [email or web address] or by calling [phone].

## 2025-08-01 ENCOUNTER — EMERGENCY (EMERGENCY)
Facility: HOSPITAL | Age: 25
LOS: 1 days | End: 2025-08-01
Attending: EMERGENCY MEDICINE
Payer: SELF-PAY

## 2025-08-01 VITALS
RESPIRATION RATE: 18 BRPM | SYSTOLIC BLOOD PRESSURE: 121 MMHG | OXYGEN SATURATION: 98 % | HEART RATE: 99 BPM | HEIGHT: 64 IN | WEIGHT: 199.96 LBS | DIASTOLIC BLOOD PRESSURE: 84 MMHG | TEMPERATURE: 98 F

## 2025-08-01 DIAGNOSIS — Z90.49 ACQUIRED ABSENCE OF OTHER SPECIFIED PARTS OF DIGESTIVE TRACT: Chronic | ICD-10-CM

## 2025-08-01 PROCEDURE — 99284 EMERGENCY DEPT VISIT MOD MDM: CPT

## 2025-08-01 PROCEDURE — 73590 X-RAY EXAM OF LOWER LEG: CPT | Mod: 26,LT

## 2025-08-01 PROCEDURE — 99283 EMERGENCY DEPT VISIT LOW MDM: CPT

## 2025-08-01 PROCEDURE — T1013: CPT

## 2025-08-01 PROCEDURE — 73590 X-RAY EXAM OF LOWER LEG: CPT

## 2025-08-01 RX ORDER — METHOCARBAMOL 500 MG/1
1500 TABLET, FILM COATED ORAL ONCE
Refills: 0 | Status: ACTIVE | OUTPATIENT
Start: 2025-08-01 | End: 2025-08-01

## 2025-08-01 RX ORDER — IBUPROFEN 200 MG
1 TABLET ORAL
Qty: 28 | Refills: 0
Start: 2025-08-01 | End: 2025-08-07

## 2025-08-01 RX ORDER — KETOROLAC TROMETHAMINE 30 MG/ML
15 INJECTION, SOLUTION INTRAMUSCULAR; INTRAVENOUS ONCE
Refills: 0 | Status: COMPLETED | OUTPATIENT
Start: 2025-08-01 | End: 2025-08-01

## 2025-08-01 RX ORDER — METHOCARBAMOL 500 MG/1
2 TABLET, FILM COATED ORAL
Qty: 18 | Refills: 0
Start: 2025-08-01 | End: 2025-08-03

## 2025-08-12 ENCOUNTER — APPOINTMENT (OUTPATIENT)
Dept: ORTHOPEDIC SURGERY | Facility: CLINIC | Age: 25
End: 2025-08-12
Payer: OTHER MISCELLANEOUS

## 2025-08-12 VITALS
SYSTOLIC BLOOD PRESSURE: 125 MMHG | BODY MASS INDEX: 34.15 KG/M2 | WEIGHT: 200 LBS | HEIGHT: 64 IN | DIASTOLIC BLOOD PRESSURE: 78 MMHG

## 2025-08-12 DIAGNOSIS — S89.92XA UNSPECIFIED INJURY OF LEFT LOWER LEG, INITIAL ENCOUNTER: ICD-10-CM

## 2025-08-12 DIAGNOSIS — M25.562 PAIN IN LEFT KNEE: ICD-10-CM

## 2025-08-12 DIAGNOSIS — S86.812A STRAIN OF OTHER MUSCLE(S) AND TENDON(S) AT LOWER LEG LEVEL, LEFT LEG, INITIAL ENCOUNTER: ICD-10-CM

## 2025-08-12 PROCEDURE — 99203 OFFICE O/P NEW LOW 30 MIN: CPT

## 2025-08-12 PROCEDURE — 73562 X-RAY EXAM OF KNEE 3: CPT | Mod: LT

## 2025-09-18 ENCOUNTER — APPOINTMENT (OUTPATIENT)
Dept: ORTHOPEDIC SURGERY | Facility: CLINIC | Age: 25
End: 2025-09-18

## 2025-09-18 DIAGNOSIS — S89.92XA UNSPECIFIED INJURY OF LEFT LOWER LEG, INITIAL ENCOUNTER: ICD-10-CM
